# Patient Record
Sex: FEMALE | Race: BLACK OR AFRICAN AMERICAN | URBAN - METROPOLITAN AREA
[De-identification: names, ages, dates, MRNs, and addresses within clinical notes are randomized per-mention and may not be internally consistent; named-entity substitution may affect disease eponyms.]

---

## 2018-07-03 ENCOUNTER — OFFICE VISIT (OUTPATIENT)
Dept: INTERNAL MEDICINE CLINIC | Age: 28
End: 2018-07-03

## 2018-07-03 VITALS
RESPIRATION RATE: 20 BRPM | OXYGEN SATURATION: 98 % | HEIGHT: 61 IN | DIASTOLIC BLOOD PRESSURE: 78 MMHG | SYSTOLIC BLOOD PRESSURE: 113 MMHG | WEIGHT: 105.3 LBS | TEMPERATURE: 97.9 F | BODY MASS INDEX: 19.88 KG/M2 | HEART RATE: 92 BPM

## 2018-07-03 DIAGNOSIS — Z76.89 ESTABLISHING CARE WITH NEW DOCTOR, ENCOUNTER FOR: Primary | ICD-10-CM

## 2018-07-03 DIAGNOSIS — N94.9 VAGINAL DISCOMFORT: ICD-10-CM

## 2018-07-03 NOTE — PROGRESS NOTES
Tanya Adam is a 24 y.o. female and presents with Pelvic Pain  . Subjective:    Pt comes-in originally for vaginal discomfort. Pt was in her USOH until ~ 10 days ago when she experienced vaginal discomfort x 3 days. Pt relays discomfort on th eoutside of her vagina whn she wiped herself. No pain w urination. No hematuria. No abn vaginal d/c. No rectal pain or bleeding. sxs self resolved 1 week ago. Pt had a similar episode ~ 3 yrs ago. She went to [facility] and had an exam the day the sxs started that was normal. No vaginal lesions/rashes/abrasions. PMH-h/o major depression  PSH-none  SH-single   -works as    +sex active female h/o male contact   -std neg ~ 2 yrs  HM  Immunizations Tdap 2010  Eye care  Week ago  Dental care 2011   Pap never  Exercise walking      Review of Systems  Review of systems (12) negative, except noted above. History reviewed. No pertinent past medical history. History reviewed. No pertinent surgical history.   Social History     Social History    Marital status: SINGLE     Spouse name: N/A    Number of children: N/A    Years of education: N/A     Social History Main Topics    Smoking status: Never Smoker    Smokeless tobacco: Never Used    Alcohol use 0.6 oz/week     1 Glasses of wine per week      Comment: weekly    Drug use: No    Sexual activity: Yes     Partners: Female     Other Topics Concern    None     Social History Narrative    None     Family History   Problem Relation Age of Onset    Diabetes Maternal Aunt     Cancer Maternal Aunt        Allergies   Allergen Reactions    Amoxicillin Hives       Objective:  Visit Vitals    /78 (BP 1 Location: Right arm, BP Patient Position: Sitting)    Pulse 92    Temp 97.9 °F (36.6 °C) (Oral)    Resp 20    Ht 5' 1\" (1.549 m)    Wt 105 lb 4.8 oz (47.8 kg)    LMP 06/10/2018 (Approximate)    SpO2 98%    BMI 19.9 kg/m2     Physical Exam:   General appearance - alert, well appearing, and in no distress. Thin, petite,   Mental status - alert, oriented to person, place, and time  EYE-JUANY, EOMI, corneas normal, no foreign bodies  ENT-ENT exam normal, no neck nodes or sinus tenderness  Mouth - mucous membranes moist, pharynx normal without lesions  Neck - supple, no significant adenopathy   Chest - clear to auscultation, no wheezes, rales or rhonchi, symmetric air entry   Heart - normal rate, regular rhythm, normal S1, S2, no murmurs, rubs, clicks or gallops   Abdomen - soft, nontender, nondistended, no masses or organomegaly  Ext-peripheral pulses normal, no pedal edema, no clubbing or cyanosis  Skin-Warm and dry. no hyperpigmentation, vitiligo, or suspicious lesions  Neuro -alert, oriented, normal speech, no focal findings or movement disorder noted    No results found for this or any previous visit. Assessment/Plan:    ICD-10-CM ICD-9-CM    1. Establishing care with new doctor, encounter for Z76.89 V65.8 TSH REFLEX TO T4      METABOLIC PANEL, COMPREHENSIVE      T PALLIDUM SCREEN W/REFLEX      T VAGINALIS AMPLIFICATION      HEPATITIS C AB      HIV 1/2 AG/AB, 4TH GENERATION,W RFLX CONFIRM      CBC W/O DIFF      CHLAMYDIA/GC PCR      CHOLESTEROL, TOTAL      CULTURE, URINE   2. Vaginal discomfort N94.9 625.9 T PALLIDUM SCREEN W/REFLEX      T VAGINALIS AMPLIFICATION      HEPATITIS C AB      HIV 1/2 AG/AB, 4TH GENERATION,W RFLX CONFIRM      CHLAMYDIA/GC PCR      CULTURE, URINE     Orders Placed This Encounter    CULTURE, URINE    TSH REFLEX TO T4    METABOLIC PANEL, COMPREHENSIVE    T PALLIDUM SCREEN W/REFLEX    T VAGINALIS AMPLIFICATION     Order Specific Question:   Specimen type     Answer:   Urine [258]    HEPATITIS C AB    HIV 1/2 AG/AB, 4TH GENERATION,W RFLX CONFIRM    CBC W/O DIFF    CHLAMYDIA/GC PCR     Order Specific Question:   Sample source     Answer:   Urine [258]     Order Specific Question:   Specimen source     Answer:   Urine [258]    CHOLESTEROL, TOTAL       1.  Establishing care with new doctor, encounter for  Completed  Pt will f/u for pap  - TSH REFLEX TO T4  - METABOLIC PANEL, COMPREHENSIVE  - T PALLIDUM SCREEN W/REFLEX  - T VAGINALIS AMPLIFICATION  - HEPATITIS C AB  - HIV 1/2 AG/AB, 4TH GENERATION,W RFLX CONFIRM  - CBC W/O DIFF  - CHLAMYDIA/GC PCR  - CHOLESTEROL, TOTAL  - CULTURE, URINE    2. Vaginal discomfort  Resolved  Make same day appt if recurs  - T PALLIDUM SCREEN W/REFLEX  - T VAGINALIS AMPLIFICATION  - HEPATITIS C AB  - HIV 1/2 AG/AB, 4TH GENERATION,W RFLX CONFIRM  - CHLAMYDIA/GC PCR  - CULTURE, URINE    There are no Patient Instructions on file for this visit. Follow-up Disposition:  Return in about 1 month (around 8/3/2018) for pap and 1 year for annual PE. I have reviewed with the patient details of the assessment and plan and all questions were answered. Relevent patient education was performed. The most recent lab findings were reviewed with the patient. An After Visit Summary was printed and given to the patient.

## 2018-07-03 NOTE — PROGRESS NOTES
Chief Complaint   Patient presents with    Pelvic Pain     1. Have you been to the ER, urgent care clinic since your last visit? Hospitalized since your last visit? No    2. Have you seen or consulted any other health care providers outside of the [address] [place] [address] since your last visit? Include any pap smears or colon screening. No     Patient c/o pain to vaginal area when \"wiping\" herself. No burning with urination, frequent urination or odor.

## 2018-07-06 LAB
ALBUMIN SERPL-MCNC: 4.7 G/DL (ref 3.5–5.5)
ALBUMIN/GLOB SERPL: 1.4 {RATIO} (ref 1.2–2.2)
ALP SERPL-CCNC: 47 IU/L (ref 39–117)
ALT SERPL-CCNC: 11 IU/L (ref 0–32)
AST SERPL-CCNC: 21 IU/L (ref 0–40)
BACTERIA UR CULT: NO GROWTH
BILIRUB SERPL-MCNC: 0.3 MG/DL (ref 0–1.2)
BUN SERPL-MCNC: 18 MG/DL (ref 6–20)
BUN/CREAT SERPL: 27 (ref 9–23)
C TRACH RRNA SPEC QL NAA+PROBE: NEGATIVE
CALCIUM SERPL-MCNC: 9.7 MG/DL (ref 8.7–10.2)
CHLORIDE SERPL-SCNC: 101 MMOL/L (ref 96–106)
CHOLEST SERPL-MCNC: 160 MG/DL (ref 100–199)
CO2 SERPL-SCNC: 22 MMOL/L (ref 20–29)
CREAT SERPL-MCNC: 0.66 MG/DL (ref 0.57–1)
ERYTHROCYTE [DISTWIDTH] IN BLOOD BY AUTOMATED COUNT: 13.3 % (ref 12.3–15.4)
GLOBULIN SER CALC-MCNC: 3.3 G/DL (ref 1.5–4.5)
GLUCOSE SERPL-MCNC: 83 MG/DL (ref 65–99)
HCT VFR BLD AUTO: 35.2 % (ref 34–46.6)
HCV AB S/CO SERPL IA: <0.1 S/CO RATIO (ref 0–0.9)
HGB BLD-MCNC: 11.5 G/DL (ref 11.1–15.9)
HIV 1+2 AB+HIV1 P24 AG SERPL QL IA: NON REACTIVE
MCH RBC QN AUTO: 28.1 PG (ref 26.6–33)
MCHC RBC AUTO-ENTMCNC: 32.7 G/DL (ref 31.5–35.7)
MCV RBC AUTO: 86 FL (ref 79–97)
N GONORRHOEA RRNA SPEC QL NAA+PROBE: NEGATIVE
PLATELET # BLD AUTO: 291 X10E3/UL (ref 150–379)
POTASSIUM SERPL-SCNC: 3.6 MMOL/L (ref 3.5–5.2)
PROT SERPL-MCNC: 8 G/DL (ref 6–8.5)
RBC # BLD AUTO: 4.09 X10E6/UL (ref 3.77–5.28)
SODIUM SERPL-SCNC: 137 MMOL/L (ref 134–144)
T PALLIDUM AB SER QL IA: NEGATIVE
T VAGINALIS RRNA SPEC QL NAA+PROBE: NEGATIVE
TSH SERPL DL<=0.005 MIU/L-ACNC: 1.05 UIU/ML (ref 0.45–4.5)
WBC # BLD AUTO: 5.8 X10E3/UL (ref 3.4–10.8)

## 2023-06-06 NOTE — MR AVS SNAPSHOT
[address] [facility] [address] [facility] [address] 
[phone] Patient: Pastor Carrel MRN: [id number] Visit Information Date & Time Provider Department Dept. Phone Encounter #  
 7/3/2018  2:00 PM Pa Garrett, [id number] [id number] Follow-up Instructions Return in about 1 month (around 8/3/2018) for pap and 1 year for annual PE. Upcoming Health Maintenance Date Due  
 HPV Age 9Y-34Y (1 of 3 - Female 3 Dose Series) 7/14/2003 DTaP/Tdap/Td series (1 - Tdap) 7/14/2013 PAP AKA CERVICAL CYTOLOGY 7/14/2013 Influenza Age 5 to Adult 8/1/2018 Allergies as of 7/3/2018  Review Complete On: 7/3/2018 By: Ira Arthur LPN Severity Noted Reaction Type Reactions Amoxicillin  07/03/2018   Systemic Hives Current Immunizations  Never Reviewed No immunizations on file. Not reviewed this visit You Were Diagnosed With   
  
 Codes Comments Establishing care with new doctor, encounter for    -  Primary ICD-10-CM: Z76.89 
ICD-9-CM: V65.8 Vaginal discomfort     ICD-10-CM: N94.9 ICD-9-CM: 625.9 Vitals BP Pulse Temp Resp Height(growth percentile) Weight(growth percentile) 113/78 (BP 1 Location: Right arm, BP Patient Position: Sitting) 92 97.9 °F (36.6 °C) (Oral) 20 5' 1\" (1.549 m) 105 lb 4.8 oz (47.8 kg) LMP SpO2 BMI OB Status Smoking Status 06/10/2018 (Approximate) 98% 19.9 kg/m2 Having regular periods Never Smoker Vitals History BMI and BSA Data Body Mass Index Body Surface Area 19.9 kg/m 2 1.43 m 2 Preferred Pharmacy Pharmacy Name Phone [address] [place] AT [phone]. [phone] Your Updated Medication List  
  
Notice  As of 7/3/2018  2:44 PM  
 You have not been prescribed any medications. We Performed the Following CBC W/O DIFF [33937 CPT(R)] CHLAMYDIA/GC PCR [31543 CPT(R)] CHOLESTEROL, TOTAL [79255 CPT(R)] CULTURE, URINE [id number] CPT(R)] HEPATITIS C AB [79181 CPT(R)] HIV 1/2 AG/AB, 4TH GENERATION,W RFLX CONFIRM [id number] CPT(R)] METABOLIC PANEL, COMPREHENSIVE [09430 CPT(R)] T PALLIDUM SCREEN W/REFLEX [[id number] Custom] Wannetta Beery VAGINALIS AMPLIFICATION [id number] CPT(R)] TSH REFLEX TO T4 [71057 CPT(R)] Follow-up Instructions Return in about 1 month (around 8/3/2018) for pap and 1 year for annual PE. Introducing [facility]! [employer] introduces [employer] patient portal. Now you can access parts of your medical record, email your doctor's office, and request medication refills online. 1. In your internet browser, go to [email or web address] 2. Click on the First Time User? Click Here link in the Sign In box. You will see the New Member Sign Up page. 3. Enter your [employer] Access Code exactly as it appears below. You will not need to use this code after youve completed the sign-up process. If you do not sign up before the expiration date, you must request a new code. · [employer] Access Code: [email or web address] Expires: 10/1/2018  1:38 PM 
 
4. Enter the last four digits of your Social Security Number (xxxx) and Date of Birth (mm/dd/yyyy) as indicated and click Submit. You will be taken to the next sign-up page. 5. Create a [employer] ID. This will be your [employer] login ID and cannot be changed, so think of one that is secure and easy to remember. 6. Create a [employer] password. You can change your password at any time. 7. Enter your Password Reset Question and Answer. This can be used at a later time if you forget your password. 8. Enter your e-mail address. You will receive e-mail notification when new information is available in [address] 9. Click Sign Up. You can now view and download portions of your medical record. 10. Click the Download Summary menu link to download a portable copy of your medical information. If you have questions, please visit the Frequently Asked Questions section of the [employer] website. Remember, [employer] is NOT to be used for urgent needs. For medical emergencies, dial 911. Now available from your iPhone and Android! Please provide this summary of care documentation to your next provider. Your primary care clinician is listed as Salima Rodriguez. If you have any questions after today's visit, please call [phone]. 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